# Patient Record
Sex: FEMALE | Race: BLACK OR AFRICAN AMERICAN | URBAN - METROPOLITAN AREA
[De-identification: names, ages, dates, MRNs, and addresses within clinical notes are randomized per-mention and may not be internally consistent; named-entity substitution may affect disease eponyms.]

---

## 2018-03-02 ENCOUNTER — OFFICE VISIT (OUTPATIENT)
Dept: ORTHOPEDIC SURGERY | Age: 38
End: 2018-03-02

## 2018-03-02 VITALS
DIASTOLIC BLOOD PRESSURE: 67 MMHG | BODY MASS INDEX: 20.96 KG/M2 | WEIGHT: 104 LBS | HEIGHT: 59 IN | SYSTOLIC BLOOD PRESSURE: 116 MMHG | HEART RATE: 84 BPM

## 2018-03-02 DIAGNOSIS — G60.0 CMT (CHARCOT-MARIE-TOOTH DISEASE): Primary | ICD-10-CM

## 2018-03-02 DIAGNOSIS — M22.2X1 PATELLOFEMORAL SYNDROME, BILATERAL: ICD-10-CM

## 2018-03-02 DIAGNOSIS — M22.2X2 PATELLOFEMORAL SYNDROME, BILATERAL: ICD-10-CM

## 2018-03-02 RX ORDER — NORETHINDRONE 5 MG/1
TABLET ORAL
Refills: 5 | COMMUNITY
Start: 2018-01-28

## 2018-03-02 RX ORDER — ESTRADIOL 2 MG/1
TABLET ORAL
Refills: 3 | COMMUNITY
Start: 2018-02-21

## 2018-03-02 NOTE — PROGRESS NOTES
Patient: Constance Bailey                MRN: [id number]       SSN: [id number]  YOB: 1980        AGE: 40 y.o. SEX: female  Body mass index is 21.01 kg/(m^2). PCP: Yamilka Whelan MD  03/02/18    Chief Complaint: Bilateral knees \"giving out\"    HISTORY OF PRESENT ILLNESS: Ms. Constance Bailey is a 40-year-old female who presents to the office today with bilateral knee complaints. She states that she feels that both of her knees give out at times. This happens when she is getting up from a chair. It can also happen when she is standing for long periods of time. She denies much in the way of pain. She feels that she has these sensations when her knees give out. Her knees also have some popping as she describes it at times. She denies any trauma or injuries associated with this. She denies any numbness or tingling. 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 These complaints have been going on for several months, if not up to one year at this point. She has not had any physical therapy or injections. Past Medical History:   Diagnosis Date    CMT (Charcot-Claudia-Tooth disease)     Neuropathy        History reviewed. No pertinent family history. Current Outpatient Prescriptions   Medication Sig Dispense Refill    estradiol (ESTRACE) 2 mg tablet TAKE 1 TABLET BY MOUTH ONCE A DAY  3    norethindrone acetate (AYGESTIN) 5 mg tablet TAKE 1 TABLET DAILY ON DAYS 21-28  5       Allergies   Allergen Reactions    Amoxicillin Hives       Past Surgical History:   Procedure Laterality Date    HX ORTHOPAEDIC Bilateral     foot reconstuction due to CMT       Social History     Social History    Marital status: UNKNOWN     Spouse name: N/A    Number of children: N/A    Years of education: N/A     Occupational History    Not on file.      Social History Main Topics    Smoking status: Never Smoker    Smokeless tobacco: Never Used    Alcohol use No    Drug use: Not on file    Sexual activity: Not on file     Other Topics Concern    Not on file     Social History Narrative    No narrative on file       REVIEW OF SYSTEMS:      CON: negative for recent weight loss/gain, fever, or chills  EYE: negative for double or blurry vision  ENT: negative for hoarseness  RS:   negative for cough, URI, SOB  CV:  negative for chest pain, palpitations  GI:    negative for blood in stool, nausea/vomiting  :  negative for blood in urine  MS: As per HPI  Other systems reviewed and noted below. PHYSICAL EXAMINATION:  Visit Vitals    /67    Pulse 84    Ht 4' 11\" (1.499 m)    Wt 104 lb (47.2 kg)    BMI 21.01 kg/m2     Body mass index is 21.01 kg/(m^2). GENERAL: Alert and oriented x3, in no acute distress, well-developed, well-nourished. HEENT: Normocephalic, atraumatic. RESP: Non labored breathing with equal chest rise on inspiration. CV: Well perfused extremities. No cyanosis or clubbing noted. ABDOMEN: Soft, non-tender, non-distended. PHYSICAL EXAMINATION:  Musculoskeletal examination of both knees reveals no obvious deformity. No warmth, erythema, or effusion. Patella tracks slightly laterally bilaterally with flexion and extension. No pain with patellar compression. No instability is appreciated. She has a negative Courts. She has a negative Lachmans. She is neurovascularly intact distally. RADIOGRAPHS:  X-rays, three views of both knees weightbearing, were obtained today in the office. These do not show any fractures, dislocations, or degenerative disc disease. On the sunrise view she has a slight lateral tilt to each of her patellae. ASSESSMENT/PLAN:  Ms. Darnella Meckel is a 70-year-old female with bilateral knee complaints. I think her issues are stemming from her patellofemoral joint. She has slight lateral subluxation and lateral tracking of her patella bilaterally.   I think that her sensation of her knees giving way is a result of her patellofemoral disease. Therefore I would like to get her started on some physical therapy to work on strengthening her quadriceps muscle. I discussed this with her. I will see her back in six weeks to see how she is doing.                 Electronically signed by: Leesa Alvarado MD

## 2018-03-02 NOTE — PATIENT INSTRUCTIONS
Patellofemoral Pain Syndrome: Care Instructions  Your Care Instructions  Patellofemoral pain syndrome is pain in the front of the knee. It is caused by overuse, weak thigh muscles (quadriceps), or a problem with the way the kneecap moves. Extra weight may also cause this syndrome. The patella is the kneecap, and the femur is the thighbone. Some people may have pain in the front of the knee from a condition called chondromalacia. In this problem, the underside of the knee cartilage wears down and frays. Cartilage is a rubbery tissue that cushions joints. In some cases, the kneecap does not move, or track, in a normal way. You may have knee pain when you run, walk down hills or steps, or do another activity. Sitting for a long time also can cause knee pain. Your knee pain may get better with medicines for pain and swelling and exercises to make your quadriceps stronger. Losing weight, if you need to, may also help with pain. Follow-up care is a key part of your treatment and safety. Be sure to make and go to all appointments, and call your doctor if you are having problems. It's also a good idea to know your test results and keep a list of the medicines you take. How can you care for yourself at home? · Ask your doctor if you can take an over-the-counter pain medicine, such as acetaminophen (Tylenol), ibuprofen (Advil, Motrin), or naproxen (Aleve). Be safe with medicines. Read and follow all instructions on the label. · Rest and protect your knee. Take a break from activities that cause pain, such as long periods of sitting or kneeling. · Put ice or a cold pack on your knee for 10 to 20 minutes after activity. Put a thin cloth between the ice and your skin. · If your doctor recommends an elastic bandage, sleeve, or other type of support for your knee, wear it as directed. · If your knee is not swollen, you can put moist heat, a heating pad, or a warm cloth on your knee.  After several days of rest, you can begin gentle exercise of your knee. · Reach and stay at a healthy weight. Being overweight puts stress on your knees. · Wear athletic shoes that offer good support, especially if you run. · Use shoe inserts, or orthotics, if they help reduce your knee pain. Many drugstores and shoe stores sell them. · See a physical therapist to learn more exercises and stretches to make your legs stronger. When should you call for help? Watch closely for changes in your health, and be sure to contact your doctor if:  ? · Your knee pain does not get better or gets worse. Where can you learn more? Go to http://tj-monika.info/. Enter X251 in the search box to learn more about \"Patellofemoral Pain Syndrome: Care Instructions. \"  Current as of: March 21, 2017  Content Version: 11.4  © 9623-6552 Healthwise, Incorporated. Care instructions adapted under license by [employer] (which disclaims liability or warranty for this information). If you have questions about a medical condition or this instruction, always ask your healthcare professional. [address] any warranty or liability for your use of this information.

## 2018-04-13 ENCOUNTER — OFFICE VISIT (OUTPATIENT)
Dept: ORTHOPEDIC SURGERY | Age: 38
End: 2018-04-13

## 2018-04-13 VITALS
DIASTOLIC BLOOD PRESSURE: 60 MMHG | SYSTOLIC BLOOD PRESSURE: 101 MMHG | BODY MASS INDEX: 21.17 KG/M2 | WEIGHT: 105 LBS | HEIGHT: 59 IN | HEART RATE: 79 BPM

## 2018-04-13 DIAGNOSIS — M62.81 MUSCLE WEAKNESS-GENERAL: ICD-10-CM

## 2018-04-13 DIAGNOSIS — M62.81 QUADRICEPS WEAKNESS: Primary | ICD-10-CM

## 2018-04-13 NOTE — MR AVS SNAPSHOT
[facility] 
 
 
 [address] [facility] 
[phone] Patient: Christina Syed MRN: [id number] ROP:1/7/[birth year]05 Visit Information Date & Time Provider Department Dept. Phone Encounter #  
 4/13/2018 10:30 AM Cristel Bernabe, [address] [facility] [place] [phone] [id number] Your Appointments 4/13/2018 10:30 AM  
Follow Up with Cristel Bernabe MD  
[address] [facility], [address] and [facility] [place] ([facility]) Appt Note: vadim knee 6wk fu after physical therapy at  [facility] PT; PT R/S TO THIS NEW DATE; vadim knee 6wk fu after physical therapy at [address] [place] [address]  
[phone]  
  
   
 2012 [address] [place] [address] Upcoming Health Maintenance Date Due DTaP/Tdap/Td series (1 - Tdap) 8/7/2001 PAP AKA CERVICAL CYTOLOGY 8/7/2001 Influenza Age 5 to Adult 8/1/2017 Allergies as of 4/13/2018  Review Complete On: 4/13/2018 By: Cristel Bernabe MD  
  
 Severity Noted Reaction Type Reactions Amoxicillin  03/02/2018    Hives Current Immunizations  Never Reviewed No immunizations on file. Not reviewed this visit You Were Diagnosed With   
  
 Codes Comments Quadriceps weakness    -  Primary ICD-10-CM: M62.81 ICD-9-CM: 728.87 Muscle weakness-general     ICD-10-CM: M62.81 ICD-9-CM: 728.87 Vitals BP Pulse Height(growth percentile) Weight(growth percentile) BMI Smoking Status 101/60 79 4' 11\" (1.499 m) 105 lb (47.6 kg) 21.21 kg/m2 Never Smoker Vitals History BMI and BSA Data Body Mass Index Body Surface Area  
 21.21 kg/m 2 1.41 m 2 Your Updated Medication List  
  
   
This list is accurate as of 4/13/18 10:15 AM.  Always use your most recent med list.  
  
  
  
  
 estradiol 2 mg tablet Commonly known as:  ESTRACE  
TAKE 1 TABLET BY MOUTH ONCE A DAY  
  
 norethindrone acetate 5 mg tablet Commonly known as:  AYGESTIN  
TAKE 1 TABLET DAILY ON DAYS 21-28 We Performed the Following REFERRAL TO PHYSICAL THERAPY [JRJ46 Custom] Comments:  
 Upper and lower extremity muscle strengthening and toning exercises with HEP. 6-8 sessions. Referral Information Referral ID Referred By Referred To  
  
 [id number] Glenny TOWNSEND Not Available Visits Status Start Date End Date 1 New Request 4/13/18 4/13/19 If your referral has a status of pending review or denied, additional information will be sent to support the outcome of this decision. Introducing [facility]! [facility] introduces Polyheal patient portal. Now you can access parts of your medical record, email your doctor's office, and request medication refills online. 1. In your internet browser, go to [email or web address] 2. Click on the First Time User? Click Here link in the Sign In box. You will see the New Member Sign Up page. 3. Enter your Polyheal Access Code exactly as it appears below. You will not need to use this code after youve completed the sign-up process. If you do not sign up before the expiration date, you must request a new code. · Polyheal Access Code: [email or web address] Expires: 5/31/2018  9:55 AM 
 
4. Enter the last four digits of your Social Security Number (xxxx) and Date of Birth (mm/dd/yyyy) as indicated and click Submit. You will be taken to the next sign-up page. 5. Create a [email or web address] ID. This will be your Polyheal login ID and cannot be changed, so think of one that is secure and easy to remember. 6. Create a Polyheal password. You can change your password at any time. 7. Enter your Password Reset Question and Answer. This can be used at a later time if you forget your password. 8. Enter your e-mail address. You will receive e-mail notification when new information is available in [address] 9. Click Sign Up. You can now view and download portions of your medical record. 10. Click the Download Summary menu link to download a portable copy of your medical information. If you have questions, please visit the Frequently Asked Questions section of the Stampt website. Remember, Stampt is NOT to be used for urgent needs. For medical emergencies, dial 911. Now available from your iPhone and Android! Please provide this summary of care documentation to your next provider. Your primary care clinician is listed as Sneha Monday. If you have any questions after today's visit, please call [phone].

## 2018-04-13 NOTE — PROGRESS NOTES
HISTORY OF PRESENT ILLNESS: Leanna Santos is here for followup with reference to her lower extremities. She saw Dr. Colleen Piedra about six weeks ago and is here for followup. At that time, he diagnosed her with some patellofemoral issues and some lower extremity quadriceps weakness. She started physical therapy. She was able to do therapy for a few sessions but then was discontinued because of insurance problems. She did state, however, she continued on somewhat of a home exercise program with resistance bands. She has not made a lot or progress overall. She does not complain of any pain in her knees or upper extremities. Her main complaint is that when she is standing for more than a minute or two or walking, she feels like her legs are giving out of her. She also notices weakness in her upper body. She has a history of Charcot-Claudia-Tooth disease. PHYSICAL EXAMINATION:  Clinical examination today reveals a frail, thin, 43-year-old, -American female in no obvious discomfort. With reference to her knees, she has absolutely no effusion or soft tissue swelling of her knees. She has full range of motion of her knees from full extension and flexion to 135? to 140? Leatha Loose There is no pain with this. There is no crepitus in the patellofemoral area of her knees with flexion/extension. Patellofemoral compression test is negative. Patellar apprehension tests are negative. She has good collateral, as well as cruciate ligament stability of her knees. Courts test is negative. Lachman test is negative bilaterally. Anterior and posterior drawer tests are negative. She has very poor quadriceps tone of both lower extremities. On resisted knee extension, I am able to slightly break her knee extension. She also has clinically very poor muscle tone of her upper body and arms. IMPRESSION:   1. Generalized muscle weakness. 2. Patellofemoral syndrome per Dr. Colleen Piedra.      RECOMMENDATIONS:  She does not have pain at this point, and I think the best option for her is to work on muscle strength in the upper and lower extremities. I told her this is not something that will occur just in six weeks. This is going to be about a two to three-year program, but not necessarily in physical therapy that entire time. She needs to go to about six or eight sessions of therapy and have them do some electrical stimulation to these muscles and wake them up and then allow her to do an aggressive home exercise program with resistance and with some weights. I would check her back in about three to four months and see what her progress is at that point, but I have told her that this will be a long process and a continual process to regain her muscle tone and then maintain muscle tone in her extremities. All of her questions were answered. Vitals:    04/13/18 0957   BP: 101/60   Pulse: 79   Weight: 105 lb (47.6 kg)   Height: 4' 11\" (1.499 m)   PainSc:   0 - No pain   PainLoc: Knee       There is no problem list on file for this patient. There are no active problems to display for this patient. Current Outpatient Prescriptions   Medication Sig Dispense Refill    estradiol (ESTRACE) 2 mg tablet TAKE 1 TABLET BY MOUTH ONCE A DAY  3    norethindrone acetate (AYGESTIN) 5 mg tablet TAKE 1 TABLET DAILY ON DAYS 21-28  5     Allergies   Allergen Reactions    Amoxicillin Hives     Past Medical History:   Diagnosis Date    CMT (Charcot-Claudia-Tooth disease)     Neuropathy      Past Surgical History:   Procedure Laterality Date    HX ORTHOPAEDIC Bilateral     foot reconstuction due to CMT     History reviewed. No pertinent family history.   Social History   Substance Use Topics    Smoking status: Never Smoker    Smokeless tobacco: Never Used    Alcohol use No

## 2018-06-20 ENCOUNTER — OFFICE VISIT (OUTPATIENT)
Dept: ORTHOPEDIC SURGERY | Age: 38
End: 2018-06-20

## 2018-06-20 VITALS
BODY MASS INDEX: 21 KG/M2 | HEIGHT: 59 IN | WEIGHT: 104.2 LBS | OXYGEN SATURATION: 100 % | RESPIRATION RATE: 16 BRPM | DIASTOLIC BLOOD PRESSURE: 67 MMHG | HEART RATE: 69 BPM | SYSTOLIC BLOOD PRESSURE: 103 MMHG

## 2018-06-20 DIAGNOSIS — M62.81 MUSCLE WEAKNESS-GENERAL: ICD-10-CM

## 2018-06-20 DIAGNOSIS — G60.0 CMT (CHARCOT-MARIE-TOOTH DISEASE): Primary | ICD-10-CM

## 2018-06-20 NOTE — PROGRESS NOTES
Chief Complaint   Patient presents with    Knee Pain     bilateral weakness/instability     Pain 0/10

## 2018-06-20 NOTE — PROGRESS NOTES
Patient: Ramón Mccormick                MRN: [id number]       SSN: [id number]  YOB: 1980        AGE: 40 y.o. SEX: female  Body mass index is 21.05 kg/(m^2). PCP: Pamela Luna MD  06/20/18    Chief Complaint: Bilateral leg weakness    History of Present Illness:  Sarah He returns to the office today for lower extremity weakness. She has been working in physical therapy, had a visit with Dr. José Santoyo while I was out who recommended physical therapy with quad stimulation. She said most of the stimulation that they did was on her lower legs near her ankles. She has gained some tone in her quads which has helped with her knee stability. She is not currently in physical therapy and has finished up her course of treatment. She has been doing her home exercises. Past Medical History:   Diagnosis Date    CMT (Charcot-Claudia-Tooth disease)     Neuropathy        History reviewed. No pertinent family history. Current Outpatient Prescriptions   Medication Sig Dispense Refill    estradiol (ESTRACE) 2 mg tablet TAKE 1 TABLET BY MOUTH ONCE A DAY  3    norethindrone acetate (AYGESTIN) 5 mg tablet TAKE 1 TABLET DAILY ON DAYS 21-28  5       Allergies   Allergen Reactions    Amoxicillin Hives       Past Surgical History:   Procedure Laterality Date    HX ORTHOPAEDIC Bilateral     foot reconstuction due to CMT       Social History     Social History    Marital status: SINGLE     Spouse name: N/A    Number of children: N/A    Years of education: N/A     Occupational History    Not on file.      Social History Main Topics    Smoking status: Never Smoker    Smokeless tobacco: Never Used    Alcohol use No    Drug use: Not on file    Sexual activity: Not on file     Other Topics Concern    Not on file     Social History Narrative       REVIEW OF SYSTEMS:      CON: negative for recent weight loss/gain, fever, or chills  EYE: negative for double or blurry vision  ENT: negative for hoarseness  RS:   negative for cough, URI, SOB  CV:  negative for chest pain, palpitations  GI:    negative for blood in stool, nausea/vomiting  :  negative for blood in urine  MS: As per HPI  Other systems reviewed and noted below. PHYSICAL EXAMINATION:  Visit Vitals    /67 (BP 1 Location: Right arm, BP Patient Position: Sitting)    Pulse 69    Resp 16    Ht 4' 11\" (1.499 m)    Wt 104 lb 3.2 oz (47.3 kg)    SpO2 100%    BMI 21.05 kg/m2     Body mass index is 21.05 kg/(m^2). GENERAL: Alert and oriented x3, in no acute distress, well-developed, well-nourished. HEENT: Normocephalic, atraumatic. RESP: Non labored breathing with equal chest rise on inspiration. CV: Well perfused extremities. No cyanosis or clubbing noted. ABDOMEN: Soft, non-tender, non-distended. PHYSICAL EXAMINATION:  Physical examination of both legs reveals some improvement in her quad tone. She still has 5-/5 strength in both quad and hamstrings. Knee extension is easily broken but she is able to extend them and hold it against some resistance. She has thin calves and ankles with 4+/5 strength in the gastrocsoleus and tibialis anterior. She walks with a crossed gait. She is neurovascularly intact distally. ASSESSMENT/PLAN:  Jaquan Delatorre is a 40 y.o. female with Charcot-Claudia-Tooth and lower extremity weakness that is greater distally than proximally which is to be expected. She has made some gains with her quads in terms of tone as well as strength. She is having difficulty with the distal musculature which is not unexpected due to the Charcot-Claudia-Tooth. I encouraged her to continue with home exercises and to work on this. This is a lifelong process and not something that is going to turn around over the course of several months. She can continue to work on these exercises at home.   I also discussed with her the possibility of buying an electrical muscle stimulator over the Internet if this is something she wants to do but I do not see any reason to continue her in physical therapy at this point. All of her questions were answered.   I will see her back as needed,            Electronically signed by: Shaquille Garcia MD

## 2019-05-31 ENCOUNTER — ED HISTORICAL/CONVERTED ENCOUNTER (OUTPATIENT)
Dept: OTHER | Age: 39
End: 2019-05-31

## 2020-05-10 ENCOUNTER — ED HISTORICAL/CONVERTED ENCOUNTER (OUTPATIENT)
Dept: OTHER | Age: 40
End: 2020-05-10

## 2023-05-26 RX ORDER — ESTRADIOL 2 MG/1
1 TABLET ORAL DAILY
COMMUNITY
Start: 2018-02-21

## 2024-05-28 DIAGNOSIS — M25.561 RIGHT KNEE PAIN, UNSPECIFIED CHRONICITY: Primary | ICD-10-CM

## 2024-05-28 DIAGNOSIS — M25.562 LEFT KNEE PAIN, UNSPECIFIED CHRONICITY: ICD-10-CM

## 2024-05-30 ENCOUNTER — HOSPITAL ENCOUNTER (OUTPATIENT)
Age: 44
Discharge: HOME | End: 2024-05-30
Payer: COMMERCIAL

## 2024-05-30 DIAGNOSIS — M25.562 LEFT KNEE PAIN, UNSPECIFIED CHRONICITY: ICD-10-CM

## 2024-05-30 DIAGNOSIS — M25.561 RIGHT KNEE PAIN, UNSPECIFIED CHRONICITY: ICD-10-CM

## 2024-05-30 PROCEDURE — 73560 X-RAY EXAM OF KNEE 1 OR 2: CPT

## 2024-05-30 PROCEDURE — 73562 X-RAY EXAM OF KNEE 3: CPT

## 2024-06-10 SDOH — HEALTH STABILITY: PHYSICAL HEALTH: ON AVERAGE, HOW MANY MINUTES DO YOU ENGAGE IN EXERCISE AT THIS LEVEL?: 30 MIN

## 2024-06-10 SDOH — HEALTH STABILITY: PHYSICAL HEALTH: ON AVERAGE, HOW MANY DAYS PER WEEK DO YOU ENGAGE IN MODERATE TO STRENUOUS EXERCISE (LIKE A BRISK WALK)?: 7 DAYS

## 2024-06-13 ENCOUNTER — OFFICE VISIT (OUTPATIENT)
Age: 44
End: 2024-06-13
Payer: COMMERCIAL

## 2024-06-13 VITALS — BODY MASS INDEX: 22.78 KG/M2 | HEIGHT: 59 IN | WEIGHT: 113 LBS

## 2024-06-13 DIAGNOSIS — M17.11 OSTEOARTHRITIS OF RIGHT KNEE, UNSPECIFIED OSTEOARTHRITIS TYPE: ICD-10-CM

## 2024-06-13 DIAGNOSIS — M17.12 OSTEOARTHRITIS OF LEFT KNEE, UNSPECIFIED OSTEOARTHRITIS TYPE: Primary | ICD-10-CM

## 2024-06-13 PROCEDURE — 99203 OFFICE O/P NEW LOW 30 MIN: CPT | Performed by: ORTHOPAEDIC SURGERY

## 2024-06-13 RX ORDER — GABAPENTIN 400 MG/1
CAPSULE ORAL
COMMUNITY

## 2024-06-13 RX ORDER — AMITRIPTYLINE HYDROCHLORIDE 25 MG/1
TABLET, FILM COATED ORAL
COMMUNITY

## 2024-06-13 RX ORDER — LEVONORGESTREL AND ETHINYL ESTRADIOL 0.15-0.03
1 KIT ORAL DAILY
COMMUNITY
Start: 2024-03-27

## 2024-06-13 NOTE — PROGRESS NOTES
Name: Oscar De La Paz    : 1980     [facility]  [address], [address]  [facility] [address]  Dept: [phone]  Dept Fax: [phone]     Chief Complaint   Patient presents with    Knee Pain     Bilateral        Ht 1.499 m (4' 11\")   Wt 51.3 kg (113 lb)   LMP 2024   BMI 22.82 kg/m²      Allergies   Allergen Reactions    Amoxicillin Hives        Current Outpatient Medications   Medication Sig Dispense Refill    SETLAKIN 0.15-0.03 MG per tablet Take 1 tablet by mouth daily      amitriptyline (ELAVIL) 25 MG tablet 1-2 TABLET BY MOUTH DAILY AT NIGHT      gabapentin (NEURONTIN) 400 MG capsule take 1 capsule by mouth every morning and 2 capsule by mouth at bedtime      estradiol (ESTRACE) 2 MG tablet Take 1 tablet by mouth daily      norethindrone (AYGESTIN) 5 MG tablet TAKE 1 TABLET DAILY ON DAYS 21-28       No current facility-administered medications for this visit.      There is no problem list on file for this patient.     Family History   Problem Relation Age of Onset    Diabetes Mother     Cancer Maternal Grandfather     Cancer Maternal Grandmother     Diabetes Maternal Grandmother     Cancer Paternal Grandfather     Cancer Maternal Aunt     Cancer Paternal Aunt     Cancer Paternal Uncle     Diabetes Maternal Aunt     Osteoporosis Maternal Uncle     Rheumatologic Disease Maternal Uncle     Rheumatologic Disease Maternal Aunt        Past Surgical History:   Procedure Laterality Date    FOOT SURGERY   and 2017    ORTHOPEDIC SURGERY Bilateral     foot reconstuction due to CMT      Past Medical History:   Diagnosis Date    CMT (Charcot-Jodee-Tooth disease)     Neuropathy     Osteoarthritis         I have reviewed and agree with Counts include [facility] beds at the [facility] and intake form in chart and the record furthermore I have reviewed prior medical record(s) regarding this patients care during this appointment.     Review of Systems:

## 2024-06-18 ENCOUNTER — HOSPITAL ENCOUNTER (OUTPATIENT)
Age: 44
Setting detail: RECURRING SERIES
Discharge: HOME | End: 2024-06-21
Payer: COMMERCIAL

## 2024-06-18 PROCEDURE — 97162 PT EVAL MOD COMPLEX 30 MIN: CPT

## 2024-06-18 NOTE — THERAPY EVALUATION
KNEE EVAL/ PT DAILY TREATMENT NOTE 10-18    Patient Name: Oscar De La Paz  Date:2024  : 1980  [x]  Patient  Verified  Payor: GEORGE / Plan: [employer] / Product Type: *No Product type* /    In time:1050  Out time:1120  Total Treatment Time (min): 30  Visit #: 1    Medicare/BCBS Only   Total Timed Codes (min):  0 1:1 Treatment Time:  30     Treatment Area: Unilateral primary osteoarthritis, left knee [M17.12]  Unilateral primary osteoarthritis, right knee [M17.11]    SUBJECTIVE  Pt reports history of knee weakness and instability, left  > right, related to CMT. Pt was diagnosed 9 years ago and reports loss of muscle over the years, most noted in hands and lower legs. Pt reports multiple falls, last one occurring a month ago due to legs giving out. Pt reports legs give way when having to stand still longest than 2 minutes without UE support. Pt independent ambulator today, denies use of AD. Pt has AFOs for B feet that she wears twice/weekly due to discomfort. Pt lives alone in 1 story home with 4 steps to enter with railings present. Pt is independent with ADLs with grab bars, drives self. Bending and lifting are difficult. Pt reports exercising daily on bike and with series of full body exercises to maintain independence. Pt follows up with PCP and neurology regularly.       Pt. Goals: \"hoping to get more strength\"    Pain Level (0-10 scale): Current: 0/10    Worst: 5/10 sharp, throbbing pain left knee  []constant [x]intermittent []improving []worsening []no change since onset    Past Medical History:   Diagnosis Date    CMT (Charcot-Jodee-Tooth disease)     Neuropathy     Osteoarthritis      Past Surgical History:   Procedure Laterality Date    FOOT SURGERY  2016 and 2017    ORTHOPEDIC SURGERY Bilateral     foot reconstuction due to CMT       Any medication changes, allergies to medications, adverse drug reactions, diagnosis change, or new procedure performed?: [x] No    [] Yes (see

## 2024-06-18 NOTE — THERAPY EVALUATION
[facility] REHABILITATION  PHYSICAL THERAPY  [facility], [address] [place]  [address]  Phone: [phone]    Fax: [phone]     PLAN OF CARE / STATEMENT OF MEDICAL NECESSITY FOR PHYSICAL THERAPY SERVICES  Patient Name: Oscar De La Paz : 1980   Medical   Diagnosis: Unilateral primary osteoarthritis, left knee [M17.12]  Unilateral primary osteoarthritis, right knee [M17.11] Treatment Diagnosis: Left knee pain, Difficult Gait   Onset Date:      Referral Source: Ned Hernandez MD Start of Care (SOC): 2024   Prior Hospitalization: See medical history Provider #: [id number]   Prior Level of Function: Independent   Comorbidities: CMT, Neuropathy, OA, Foot reconstruction (, )   Medications: Verified on Patient Summary List   The Plan of Care and following information is based on the information from the initial evaluation.   ==========================================================================================  Assessment / Functional Analysis:    Pt is a 43 y.o. year old  female who presents to outpatient clinic today with referral for B knee pain. 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 Pt presents today ambulating independently with impairments that include decreased knee flexion AROM, B LE weakness, decreased endurance, decreased standing balance and intermittent pain limiting function. Pt will benefit from skilled PT intervention to target deficits in effort to maximize pain-free daily activity and improve functional mobility endurance, safety and fall prevention in home and community.   ==========================================================================================  Eval Complexity: History: MEDIUM  Complexity : 1-2 comorbidities / personal factors will impact the outcome/ POC Exam:MEDIUM Complexity : 3 Standardized tests and measures addressin body structure, function,

## 2024-06-20 ENCOUNTER — HOSPITAL ENCOUNTER (OUTPATIENT)
Age: 44
Setting detail: RECURRING SERIES
Discharge: HOME | End: 2024-06-23
Payer: COMMERCIAL

## 2024-06-20 PROCEDURE — 97110 THERAPEUTIC EXERCISES: CPT

## 2024-06-20 PROCEDURE — 97112 NEUROMUSCULAR REEDUCATION: CPT

## 2024-06-20 NOTE — PROGRESS NOTES
PT DAILY TREATMENT NOTE     Patient Name: Oscar De La Paz  Date:2024  : 1980  [x]  Patient  Verified  Payor: GEORGE / Plan: [employer] [facility] / Product Type: *No Product type* /    In time:1100  Out time:1147  Total Treatment Time (min): 47  Total Timed Codes (min): 47  1:1 Treatment Time (min): 47   Visit #: 2 of 7    Treatment Area: Unilateral primary osteoarthritis, left knee [M17.12]  Unilateral primary osteoarthritis, right knee [M17.11]    SUBJECTIVE  Pt reported states that she feels ok today.    Pain Level (0-10 scale): 0    Any medication changes, allergies to medications, adverse drug reactions, diagnosis change, or new procedure performed?: [x] No    [] Yes (see summary sheet for update)        OBJECTIVE    30 min Therapeutic Exercise:  [x] See flow sheet :   Rationale: increase ROM, increase strength, improve coordination, and improve balance to improve the patient’s ability to return to prior level of function before injury/illness with reduced pain, achieving optimal strength and function to perform household tasks, daily activities, and return to community events, and improve safety with movements.     17 min Neuromuscular Re-education:  [x]  See flow sheet :   Rationale: increase ROM, increase strength, improve coordination, and improve balance  to improve the patient’s ability to improve balance and stability, attempting to gain control of movement and strength.       min Gait Training:  ___ feet with ___ device on level surfaces with ___ level of assist   Rationale:           With IFEOMA [place]ORRE   NR  GT   [facility] Patient Education: [x] Review HEP    [x] Progressed/Changed HEP based on:   [] positioning   [] body mechanics   [] transfers   [] heat/ice application          Pain Level (0-10 scale) post treatment: 0    ASSESSMENT/Changes in Function: Began session today with warm up on stepper, then proceeded to sit to stands w CGA, then to supine to perform balance challenges in // bars: 
2

## 2024-06-26 ENCOUNTER — HOSPITAL ENCOUNTER (OUTPATIENT)
Age: 44
Setting detail: RECURRING SERIES
Discharge: HOME | End: 2024-06-29
Payer: COMMERCIAL

## 2024-06-26 PROCEDURE — 97112 NEUROMUSCULAR REEDUCATION: CPT

## 2024-06-26 PROCEDURE — 97150 GROUP THERAPEUTIC PROCEDURES: CPT

## 2024-06-26 PROCEDURE — 97110 THERAPEUTIC EXERCISES: CPT

## 2024-06-26 NOTE — PROGRESS NOTES
PT DAILY TREATMENT NOTE     Patient Name: Oscar De La Paz  Date:2024  : 1980  [x]  Patient  Verified  Payor: GEORGE / Plan: [employer] [facility] / Product Type: *No Product type* /    In time:10:54  Out time:1146  Total Treatment Time (min): 52  Total Timed Codes (min): 52  1:1 Treatment Time (min): 52   Visit #: 3 of 7    Treatment Area: Unilateral primary osteoarthritis, left knee [M17.12]  Unilateral primary osteoarthritis, right knee [M17.11]    SUBJECTIVE  Pt reported states that she is doing ok today. Denies pain, reports no falls.    Pain Level (0-10 scale): 0    Any medication changes, allergies to medications, adverse drug reactions, diagnosis change, or new procedure performed?: [x] No    [] Yes (see summary sheet for update)    OBJECTIVE    15 min Therapeutic Exercise:  [x] See flow sheet :   Rationale: increase ROM, increase strength, improve coordination, and improve balance to improve the patient’s ability to return to prior level of function before injury/illness with reduced pain, achieving optimal strength and function to perform household tasks, daily activities, and return to community events, and improve safety with movements.     22 min Neuromuscular Re-education:  [x]  See flow sheet :   Rationale: increase ROM, increase strength, improve coordination, and improve balance  to improve the patient’s ability to improve balance and stability, attempting to gain control of movement and strength.    15 min Group:  Overlapped with another patient.   Rationale: Overlap with other patient.     min Gait Training:  ___ feet with ___ device on level surfaces with ___ level of assist   Rationale:           With IFEOMA  TA   NR  GT   Misc Patient Education: [x] Review HEP    [x] Progressed/Changed HEP based on:   [] positioning   [] body mechanics   [] transfers   [] heat/ice application          Pain Level (0-10 scale) post treatment: 0    ASSESSMENT/Changes in Function: Began session today

## 2024-06-28 ENCOUNTER — HOSPITAL ENCOUNTER (OUTPATIENT)
Age: 44
Setting detail: RECURRING SERIES
Discharge: HOME | End: 2024-07-01
Payer: COMMERCIAL

## 2024-06-28 PROCEDURE — 97110 THERAPEUTIC EXERCISES: CPT

## 2024-06-28 PROCEDURE — 97112 NEUROMUSCULAR REEDUCATION: CPT

## 2024-06-28 NOTE — PROGRESS NOTES
PT DAILY TREATMENT NOTE     Patient Name: Oscar De La Paz  Date:2024  : 1980  [x]  Patient  Verified  Payor: GEORGE / Plan: [employer] [facility] / Product Type: *No Product type* /    In time:1048  Out time:1140  Total Treatment Time (min): 52  Total Timed Codes (min): 52  1:1 Treatment Time (min): 52   Visit #: 4 of 7    Treatment Area: Unilateral primary osteoarthritis, left knee [M17.12]  Unilateral primary osteoarthritis, right knee [M17.11]    SUBJECTIVE  Pt reported states that she is doing ok today.     Pain Level (0-10 scale): 0    Any medication changes, allergies to medications, adverse drug reactions, diagnosis change, or new procedure performed?: [x] No    [] Yes (see summary sheet for update)    OBJECTIVE    31 min Therapeutic Exercise:  [x] See flow sheet :   Rationale: increase ROM, increase strength, improve coordination, and improve balance to improve the patient’s ability to return to prior level of function before injury/illness with reduced pain, achieving optimal strength and function to perform household tasks, daily activities, and return to community events, and improve safety with movements.     21 min Neuromuscular Re-education:  [x]  See flow sheet :   Rationale: increase ROM, increase strength, improve coordination, and improve balance  to improve the patient’s ability to improve balance and stability, attempting to gain control of movement and strength.       min Gait Training:  ___ feet with ___ device on level surfaces with ___ level of assist   Rationale:           With IFEOMA  TA   NR  GT   [facility] Patient Education: [x] Review HEP    [x] Progressed/Changed HEP based on:   [] positioning   [] body mechanics   [] transfers   [] heat/ice application          Pain Level (0-10 scale) post treatment: 0    ASSESSMENT/Changes in Function: Began session today with warm up on stepper, then proceeded to sit to stands with SBA. Pt has LOB but self corrects.  Supine exercise heel

## 2024-07-02 ENCOUNTER — TELEPHONE (OUTPATIENT)
Age: 44
End: 2024-07-02

## 2024-07-02 ENCOUNTER — HOSPITAL ENCOUNTER (OUTPATIENT)
Age: 44
Setting detail: RECURRING SERIES
Discharge: HOME | End: 2024-07-05
Payer: COMMERCIAL

## 2024-07-02 PROCEDURE — 97112 NEUROMUSCULAR REEDUCATION: CPT

## 2024-07-02 PROCEDURE — 97110 THERAPEUTIC EXERCISES: CPT

## 2024-07-02 NOTE — PROGRESS NOTES
PT DAILY TREATMENT NOTE     Patient Name: Oscar De La Paz  Date:2024  : 1980  [x]  Patient  Verified  Payor: GEORGE / Plan: [employer] [facility] / Product Type: *No Product type* /    In time:1100  Out time:1151  Total Treatment Time (min): 51  Total Timed Codes (min): 51  1:1 Treatment Time (min): 51  Visit #: 5 of 7    Treatment Area: Unilateral primary osteoarthritis, left knee [M17.12]  Unilateral primary osteoarthritis, right knee [M17.11]    SUBJECTIVE  Pt reported states that she is doing ok today.     Pain Level (0-10 scale): 0    Any medication changes, allergies to medications, adverse drug reactions, diagnosis change, or new procedure performed?: [x] No    [] Yes (see summary sheet for update)    OBJECTIVE    30 min Therapeutic Exercise:  [x] See flow sheet :   Rationale: increase ROM, increase strength, improve coordination, and improve balance to improve the patient’s ability to return to prior level of function before injury/illness with reduced pain, achieving optimal strength and function to perform household tasks, daily activities, and return to community events, and improve safety with movements.     21 min Neuromuscular Re-education:  [x]  See flow sheet :   Rationale: increase ROM, increase strength, improve coordination, and improve balance  to improve the patient’s ability to improve balance and stability, attempting to gain control of movement and strength.       min Gait Training:  ___ feet with ___ device on level surfaces with ___ level of assist   Rationale:           With TE  TA   NR  GT   [facility] Patient Education: [x] Review HEP    [x] Progressed/Changed HEP based on:   [] positioning   [] body mechanics   [] transfers   [] heat/ice application          Pain Level (0-10 scale) post treatment: 0    ASSESSMENT/Changes in Function: Began session today with warm up on stepper, then proceeded to sit to stands with airex pad under feet, SBA. Pt has LOB but self corrects.

## 2024-07-02 NOTE — TELEPHONE ENCOUNTER
Patient was scheduled in error back in May for knee instability with an elbow/shoulder specialist.  She was contacted today to reschedule that appointment and indicated she needed the [facility] office for appointments.  Patient was offered 2:40 on 7/11 as this was the closest to the original appointment.  She was seen by MADIHA and says she has not been pleased with their treatment plan and that therapy is failing so this would be a second opinion.  Xrays are in chart from May.  Please advise if this appointment is acceptable as scheduled or if it needs to be changed.

## 2024-07-09 ENCOUNTER — APPOINTMENT (OUTPATIENT)
Age: 44
End: 2024-07-09
Payer: COMMERCIAL

## 2024-07-11 ENCOUNTER — HOSPITAL ENCOUNTER (OUTPATIENT)
Age: 44
Setting detail: RECURRING SERIES
Discharge: HOME | End: 2024-07-14
Payer: COMMERCIAL

## 2024-07-11 PROCEDURE — 97112 NEUROMUSCULAR REEDUCATION: CPT

## 2024-07-11 PROCEDURE — 97110 THERAPEUTIC EXERCISES: CPT

## 2024-07-11 NOTE — PROGRESS NOTES
Patient: Oscar De La Paz                MRN: [id number]       SSN: [id number]  YOB: 1980        AGE: 43 y.o.        SEX: female  BMI: Body mass index is 22.82 kg/m².    PCP: Mark Mixon III, MD  07/12/24    Chief Complaint: Knee Pain (bilat)      1. Bilateral congenital genu valgum  -     [05961] Knee 4V  -     [40069] Knee 4V  -     DME Order for (Specify) as OP  2. Sensation of knee instability  -     [00327] Knee 4V  -     [06819] Knee 4V  3. Primary osteoarthritis of right knee  4. Primary osteoarthritis of left knee        HPI:  Oscar De La Paz is a 43 y.o. female with chief complaint of   Chief Complaint   Patient presents with    Knee Pain     bilat     Patient reports today for bilateral knee instability.  She was previously seen by Dr. Hernandez on June 13, 2024.  She was diagnosed with bilateral knee chondromalacia and referred to physical therapy.  She states that physical therapy did help but she continues to have a lot of instability. She comes in today for a second opinion and is wondering if custom knee braces would help her as she is not in constant pain and has no clicking/ locking of her knees. She denies any hip pain or radicular symptoms.     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 No evidence of acute fractures or dislocations.       PHYSICAL EXAMINATION:  Ht 1.499 m (4' 11\")   LMP 04/06/2024   BMI 22.82 kg/m²   Body mass index is 22.82 kg/m².  Wt Readings from Last 3 Encounters:   06/13/24 51.3 kg (113 lb)       GENERAL: Alert and oriented x3, in no acute distress.  HEENT: Normocephalic, atraumatic.    MSK: Right Knee Exam     Tenderness   The

## 2024-07-11 NOTE — PROGRESS NOTES
PT DAILY TREATMENT NOTE     Patient Name: Oscar De La Paz  Date:2024  : 1980  [x]  Patient  Verified  Payor: GEORGE / Plan: [employer] / Product Type: *No Product type* /    In time:10:20  Out time:11:15  Total Treatment Time (min): 55  Total Timed Codes (min): 55  1:1 Treatment Time (min): 55   Visit # 6 of 7      Treatment Area: Unilateral primary osteoarthritis, left knee [M17.12]  Unilateral primary osteoarthritis, right knee [M17.11]    SUBJECTIVE  Pt reports to therapy with no new complaints  Pain Level (0-10 scale): 0/10  Any medication changes, allergies to medications, adverse drug reactions, diagnosis change, or new procedure performed?: [x] No    [] Yes (see summary sheet for update)        OBJECTIVE    30 min Therapeutic Exercise:  [x] See flow sheet :   Rationale: increase ROM and increase strength to improve the patient’s ability to perform ADLs with greater ease.    25 min Neuromuscular Re-education:  [x]  See flow sheet :   Rationale: improve coordination and improve balance  to improve the patient’s ability to ambulate safely and improve functional mobility.            With TE  TA   NR  GT   Misc Patient Education: [x] Review HEP    [] Progressed/Changed HEP based on:   [] positioning   [] body mechanics   [] transfers   [] heat/ice application          Pain Level (0-10 scale) post treatment: 0/10    ASSESSMENT/Changes in Function: see progress note      Patient will continue to benefit from skilled PT services to modify and progress therapeutic interventions, analyze and address functional mobility deficits, analyze and address ROM deficits, analyze and address strength deficits, and analyze and address soft tissue restrictions to attain remaining goals.     []  See Plan of Care  [x]  See progress note/recertification  []  See Discharge Summary             PLAN  [x]  Upgrade activities as tolerated     [x]  Continue plan of care  [x]  Update interventions per flow

## 2024-07-11 NOTE — THERAPY RECERTIFICATION
[facility] REHABILITATION  PHYSICAL THERAPY  [facility], [address] [place]  [address]  Phone: [phone]    Fax: [phone]   Progress Note/CONTINUED PLAN OF CARE for PHYSICAL THERAPY          Patient Name: Oscar De La Paz : 1980   Treatment/Medical Diagnosis: Unilateral primary osteoarthritis, left knee [M17.12]  Unilateral primary osteoarthritis, right knee [M17.11]   Onset Date:     Referral Source: Ned Hernandez MD Start of Care (SOC): 24   Prior Hospitalization: See Medical History Provider #: [id number]   Prior Level of Function: Independent   Comorbidities: Past Medical History:   Diagnosis Date    CMT (Charcot-Jodee-Tooth disease)     Neuropathy     Osteoarthritis    Foot reconstruction in , )     Medications: Verified on Patient Summary List   Visits from SOC: 6 Missed Visits: 1       Objective:  Palpation: non-tender, decreased to light touch distal LEs        ROM / Strength  [] Unable to assess                 AROM     PROM          Strength       Left Right Left Right Left Right   Hip Flexion         3/5 3/5     Extension         3+/5  (3/5) 3+/5     Abduction         3/5  (2+/5) 3/5     Adduction               Knee Flexion 130  (124) 130  (120) 138  (134) 135  (132) 3+/5  (3/5) 3+/5  (3/5)     Extension WFL WFL     3+/5 3+/5   Ankle Plantarflexion         3+/5  (3/5) 3+/5  (3/5)     Dorsiflexion  Inversion  Eversion         3/5  (<3/5) 3/5  (<3/5)   -limited with ankle AROM bilaterally secondary to CMT and surgical history      Patellar Mobility:     Hypermobile:       [] Left   [] Right         Hypomobile:   [] Left   [] Right                   .     Gait:                [] Normal     [x] Antalgic    [] NWB    Device: none                          Distance: 250 feet  Comments: discontinuous pattern, decreased corwin, asymmetrical stance times, decreased knee flexion, hyperextension in stance        Balance: Static stance: Good, Dynamic

## 2024-07-12 ENCOUNTER — OFFICE VISIT (OUTPATIENT)
Age: 44
End: 2024-07-12
Payer: COMMERCIAL

## 2024-07-12 ENCOUNTER — APPOINTMENT (OUTPATIENT)
Age: 44
End: 2024-07-12
Payer: COMMERCIAL

## 2024-07-12 VITALS — BODY MASS INDEX: 22.82 KG/M2 | HEIGHT: 59 IN

## 2024-07-12 DIAGNOSIS — Q74.1 BILATERAL CONGENITAL GENU VALGUM: Primary | ICD-10-CM

## 2024-07-12 DIAGNOSIS — R29.898 SENSATION OF KNEE INSTABILITY: ICD-10-CM

## 2024-07-12 DIAGNOSIS — M17.11 PRIMARY OSTEOARTHRITIS OF RIGHT KNEE: ICD-10-CM

## 2024-07-12 DIAGNOSIS — M17.12 PRIMARY OSTEOARTHRITIS OF LEFT KNEE: ICD-10-CM

## 2024-07-12 PROCEDURE — 99204 OFFICE O/P NEW MOD 45 MIN: CPT

## 2024-07-12 PROCEDURE — 73564 X-RAY EXAM KNEE 4 OR MORE: CPT

## 2024-07-16 ENCOUNTER — HOSPITAL ENCOUNTER (OUTPATIENT)
Age: 44
Setting detail: RECURRING SERIES
Discharge: HOME | End: 2024-07-19
Payer: COMMERCIAL

## 2024-07-16 ENCOUNTER — APPOINTMENT (OUTPATIENT)
Age: 44
End: 2024-07-16
Payer: COMMERCIAL

## 2024-07-16 PROCEDURE — 97110 THERAPEUTIC EXERCISES: CPT

## 2024-07-16 PROCEDURE — 97112 NEUROMUSCULAR REEDUCATION: CPT

## 2024-07-16 NOTE — PROGRESS NOTES
PT DAILY TREATMENT NOTE     Patient Name: Oscar De La Paz  Date:2024  : 1980  [x]  Patient  Verified  Payor: GEORGE / Plan: [employer] / Product Type: *No Product type* /    In time:1024  Out time:1108  Total Treatment Time (min): 44  Total Timed Codes (min): 44  1:1 Treatment Time (min): 44  Visit #: 7 of 7    Treatment Area: Unilateral primary osteoarthritis, left knee [M17.12]  Unilateral primary osteoarthritis, right knee [M17.11]    SUBJECTIVE  Pt reported states that she is doing ok today.     Pain Level (0-10 scale): 0    Any medication changes, allergies to medications, adverse drug reactions, diagnosis change, or new procedure performed?: [x] No    [] Yes (see summary sheet for update)    OBJECTIVE    16 min Therapeutic Exercise:  [x] See flow sheet :   Rationale: increase ROM, increase strength, improve coordination, and improve balance to improve the patient’s ability to return to prior level of function before injury/illness with reduced pain, achieving optimal strength and function to perform household tasks, daily activities, and return to community events, and improve safety with movements.     28 min Neuromuscular Re-education:  [x]  See flow sheet :   Rationale: increase ROM, increase strength, improve coordination, and improve balance  to improve the patient’s ability to improve balance and stability, attempting to gain control of movement and strength.       min Gait Training:  ___ feet with ___ device on level surfaces with ___ level of assist   Rationale:           With TE  TA   NR  GT   [facility] Patient Education: [x] Review HEP    [x] Progressed/Changed HEP based on:   [] positioning   [] body mechanics   [] transfers   [] heat/ice application          Pain Level (0-10 scale) post treatment: 0    ASSESSMENT/Changes in Function: Began session today with warm up on stepper, then proceeded to sit to stands with airex pad under feet, SBA. Pt has LOB but self corrects

## 2024-07-18 ENCOUNTER — HOSPITAL ENCOUNTER (OUTPATIENT)
Age: 44
Setting detail: RECURRING SERIES
Discharge: HOME | End: 2024-07-21
Payer: COMMERCIAL

## 2024-07-18 PROCEDURE — 97110 THERAPEUTIC EXERCISES: CPT

## 2024-07-18 PROCEDURE — 97112 NEUROMUSCULAR REEDUCATION: CPT

## 2024-07-18 NOTE — PROGRESS NOTES
PT DAILY TREATMENT NOTE     Patient Name: Oscar De La Paz  Date:2024  : 1980  [x]  Patient  Verified  Payor: GEORGE / Plan: [employer] / Product Type: *No Product type* /    In time:1006  Out time:1050  Total Treatment Time (min): 44  Total Timed Codes (min): 44  1:1 Treatment Time (min): 44  Visit #: 1 of 5 (new auth)    Treatment Area: Unilateral primary osteoarthritis, left knee [M17.12]  Unilateral primary osteoarthritis, right knee [M17.11]    SUBJECTIVE  Pt reported states that she is doing ok today.     Pain Level (0-10 scale): 0    Any medication changes, allergies to medications, adverse drug reactions, diagnosis change, or new procedure performed?: [x] No    [] Yes (see summary sheet for update)    OBJECTIVE    18 min Therapeutic Exercise:  [x] See flow sheet :   Rationale: increase ROM, increase strength, improve coordination, and improve balance to improve the patient’s ability to return to prior level of function before injury/illness with reduced pain, achieving optimal strength and function to perform household tasks, daily activities, and return to community events, and improve safety with movements.     26 min Neuromuscular Re-education:  [x]  See flow sheet :   Rationale: increase ROM, increase strength, improve coordination, and improve balance  to improve the patient’s ability to improve balance and stability, attempting to gain control of movement and strength.       min Gait Training:  ___ feet with ___ device on level surfaces with ___ level of assist   Rationale:           With IFEOMA  TA   NR  GT   [facility] Patient Education: [x] Review HEP    [x] Progressed/Changed HEP based on:   [] positioning   [] body mechanics   [] transfers   [] heat/ice application          Pain Level (0-10 scale) post treatment: 0    ASSESSMENT/Changes in Function: Began session today with warm up on stepper, then proceeded to sit to stands with airex pad under feet, SBA. Pt has LOB but self

## 2024-07-19 ENCOUNTER — APPOINTMENT (OUTPATIENT)
Age: 44
End: 2024-07-19
Payer: COMMERCIAL

## 2024-07-23 ENCOUNTER — HOSPITAL ENCOUNTER (OUTPATIENT)
Age: 44
Setting detail: RECURRING SERIES
Discharge: HOME | End: 2024-07-26
Payer: COMMERCIAL

## 2024-07-23 PROCEDURE — 97110 THERAPEUTIC EXERCISES: CPT

## 2024-07-23 PROCEDURE — 97112 NEUROMUSCULAR REEDUCATION: CPT

## 2024-07-23 NOTE — PROGRESS NOTES
PT DAILY TREATMENT NOTE     Patient Name: Oscar De La Paz  Date:2024  : 1980  [x]  Patient  Verified  Payor: GEORGE / Plan: [employer] / Product Type: *No Product type* /    In time:1032  Out time:1116  Total Treatment Time (min): 44  Total Timed Codes (min): 44  1:1 Treatment Time (min): 44  Visit #: 2 of 5 (new auth)    Treatment Area: Unilateral primary osteoarthritis, left knee [M17.12]  Unilateral primary osteoarthritis, right knee [M17.11]    SUBJECTIVE  Pt reported states that she is doing ok today.     Pain Level (0-10 scale): 0    Any medication changes, allergies to medications, adverse drug reactions, diagnosis change, or new procedure performed?: [x] No    [] Yes (see summary sheet for update)    OBJECTIVE    14 min Therapeutic Exercise:  [x] See flow sheet :   Rationale: increase ROM, increase strength, improve coordination, and improve balance to improve the patient’s ability to return to prior level of function before injury/illness with reduced pain, achieving optimal strength and function to perform household tasks, daily activities, and return to community events, and improve safety with movements.     30 min Neuromuscular Re-education:  [x]  See flow sheet :   Rationale: increase ROM, increase strength, improve coordination, and improve balance  to improve the patient’s ability to improve balance and stability, attempting to gain control of movement and strength.       min Gait Training:  ___ feet with ___ device on level surfaces with ___ level of assist   Rationale:           With IFEOMA  TA   NR  GT   [facility] Patient Education: [x] Review HEP    [x] Progressed/Changed HEP based on:   [] positioning   [] body mechanics   [] transfers   [] heat/ice application          Pain Level (0-10 scale) post treatment: 0    ASSESSMENT/Changes in Function: Began session today with warm up on stepper, then proceeded to sit to stands with airex pad under feet, SBA. Pt has LOB but self

## 2024-07-25 ENCOUNTER — HOSPITAL ENCOUNTER (OUTPATIENT)
Age: 44
Setting detail: RECURRING SERIES
Discharge: HOME | End: 2024-07-28
Payer: COMMERCIAL

## 2024-07-25 PROCEDURE — 97110 THERAPEUTIC EXERCISES: CPT

## 2024-07-25 PROCEDURE — 97112 NEUROMUSCULAR REEDUCATION: CPT

## 2024-07-25 NOTE — PROGRESS NOTES
PT DAILY TREATMENT NOTE     Patient Name: Oscar De La Paz  Date:2024  : 1980  [x]  Patient  Verified  Payor: GEORGE / Plan: [employer] / Product Type: *No Product type* /    In time:1024  Out time:1108  Total Treatment Time (min): 44  Total Timed Codes (min): 44  1:1 Treatment Time (min): 44  Visit #: 3 of 5 (new auth)    Treatment Area: Unilateral primary osteoarthritis, left knee [M17.12]  Unilateral primary osteoarthritis, right knee [M17.11]    SUBJECTIVE  Pt reported states that she is doing ok today. No complaints.     Pain Level (0-10 scale): 0    Any medication changes, allergies to medications, adverse drug reactions, diagnosis change, or new procedure performed?: [x] No    [] Yes (see summary sheet for update)    OBJECTIVE    14 min Therapeutic Exercise:  [x] See flow sheet :   Rationale: increase ROM, increase strength, improve coordination, and improve balance to improve the patient’s ability to return to prior level of function before injury/illness with reduced pain, achieving optimal strength and function to perform household tasks, daily activities, and return to community events, and improve safety with movements.     30 min Neuromuscular Re-education:  [x]  See flow sheet :   Rationale: increase ROM, increase strength, improve coordination, and improve balance  to improve the patient’s ability to improve balance and stability, attempting to gain control of movement and strength.       min Gait Training:  ___ feet with ___ device on level surfaces with ___ level of assist   Rationale:           With IFEOMA  TA   NR  GT   [facility] [place]: [x] Review HEP    [x] Progressed/Changed HEP based on:   [] positioning   [] body mechanics   [] transfers   [] heat/ice application          Pain Level (0-10 scale) post treatment: 0    ASSESSMENT/Changes in Function: Began session today with warm up on stepper, then proceeded to sit to stands with airex pad under feet, SBA. Pt has

## 2024-07-26 SDOH — HEALTH STABILITY: PHYSICAL HEALTH: ON AVERAGE, HOW MANY MINUTES DO YOU ENGAGE IN EXERCISE AT THIS LEVEL?: 30 MIN

## 2024-07-26 SDOH — HEALTH STABILITY: PHYSICAL HEALTH: ON AVERAGE, HOW MANY DAYS PER WEEK DO YOU ENGAGE IN MODERATE TO STRENUOUS EXERCISE (LIKE A BRISK WALK)?: 7 DAYS

## 2024-07-29 ENCOUNTER — OFFICE VISIT (OUTPATIENT)
Age: 44
End: 2024-07-29
Payer: COMMERCIAL

## 2024-07-29 VITALS — BODY MASS INDEX: 23.18 KG/M2 | WEIGHT: 115 LBS | HEIGHT: 59 IN

## 2024-07-29 DIAGNOSIS — R26.81 UNSTEADY GAIT WHEN WALKING: Primary | ICD-10-CM

## 2024-07-29 DIAGNOSIS — M54.41 CHRONIC BILATERAL LOW BACK PAIN WITH BILATERAL SCIATICA: ICD-10-CM

## 2024-07-29 DIAGNOSIS — G60.0 CHARCOT-MARIE DISEASE: ICD-10-CM

## 2024-07-29 DIAGNOSIS — M54.42 CHRONIC BILATERAL LOW BACK PAIN WITH BILATERAL SCIATICA: ICD-10-CM

## 2024-07-29 DIAGNOSIS — G89.29 CHRONIC BILATERAL LOW BACK PAIN WITH BILATERAL SCIATICA: ICD-10-CM

## 2024-07-29 PROCEDURE — 99214 OFFICE O/P EST MOD 30 MIN: CPT | Performed by: ORTHOPAEDIC SURGERY

## 2024-07-29 NOTE — PROGRESS NOTES
Session: 30 min   Stress: Not on file   Social Connections: Not on file   Intimate Partner Violence: Not on file   Housing Stability: Not on file       REVIEW OF SYSTEMS:      Negative except for that stated above.     [unfilled]      Prescription medication management discussed with patient.     Electronically signed by: Fabrizio Barboza DO    Note: This note was completed using voice recognition software.  Any typographical/name errors or mistakes are unintentional.

## 2024-07-31 ENCOUNTER — HOSPITAL ENCOUNTER (OUTPATIENT)
Age: 44
Setting detail: RECURRING SERIES
Discharge: HOME | End: 2024-08-03
Payer: COMMERCIAL

## 2024-07-31 PROCEDURE — 97110 THERAPEUTIC EXERCISES: CPT

## 2024-07-31 PROCEDURE — 97112 NEUROMUSCULAR REEDUCATION: CPT

## 2024-07-31 NOTE — PROGRESS NOTES
PT DAILY TREATMENT NOTE     Patient Name: Oscar De La Paz  Date:2024  : 1980  [x]  Patient  Verified  Payor: GEORGE / Plan: [employer] / Product Type: *No Product type* /    In time:1100  Out time:1146  Total Treatment Time (min): 46  Total Timed Codes (min): 46  1:1 Treatment Time (min): 46  Visit #: 2 of 5 (new auth)    Treatment Area: Unilateral primary osteoarthritis, left knee [M17.12]  Unilateral primary osteoarthritis, right knee [M17.11]    SUBJECTIVE  Pt reported states that she is doing ok today.     Pain Level (0-10 scale): 0    Any medication changes, allergies to medications, adverse drug reactions, diagnosis change, or new procedure performed?: [x] No    [] Yes (see summary sheet for update)    OBJECTIVE    12 min Therapeutic Exercise:  [x] See flow sheet :   Rationale: increase ROM, increase strength, improve coordination, and improve balance to improve the patient’s ability to return to prior level of function before injury/illness with reduced pain, achieving optimal strength and function to perform household tasks, daily activities, and return to community events, and improve safety with movements.     34 min Neuromuscular Re-education:  [x]  See flow sheet :   Rationale: increase ROM, increase strength, improve coordination, and improve balance  to improve the patient’s ability to improve balance and stability, attempting to gain control of movement and strength.       min Gait Training:  ___ feet with ___ device on level surfaces with ___ level of assist   Rationale:           With IFEOMA  TA   NR  GT   [facility] [place]: [x] Review HEP    [x] Progressed/Changed HEP based on:   [] positioning   [] body mechanics   [] transfers   [] heat/ice application          Pain Level (0-10 scale) post treatment: 0    ASSESSMENT/Changes in Function: Began session today with warm up on stepper, then proceeded to sit to stands with airex pad under feet, SBA. Pt has LOB but self

## 2024-08-02 ENCOUNTER — HOSPITAL ENCOUNTER (OUTPATIENT)
Age: 44
Setting detail: RECURRING SERIES
Discharge: HOME | End: 2024-08-05
Payer: MEDICARE

## 2024-08-02 PROCEDURE — 97110 THERAPEUTIC EXERCISES: CPT

## 2024-08-02 NOTE — PROGRESS NOTES
PT DAILY TREATMENT NOTE     Patient Name: Oscar De La Paz  Date:2024  : 1980  [x]  Patient  Verified  Payor: GEORGE / Plan: [employer] / Product Type: *No Product type* /    In time:9:54  Out time:10:26  Total Treatment Time (min): 32  Total Timed Codes (min): 32  1:1 Treatment Time (min): 32   Visit # 12      Treatment Area: Unilateral primary osteoarthritis, left knee [M17.12]  Unilateral primary osteoarthritis, right knee [M17.11]    SUBJECTIVE  Pt reports no new complaints, and that she is ready to d/c from therapy.     Pain Level (0-10 scale): 0/10  Any medication changes, allergies to medications, adverse drug reactions, diagnosis change, or new procedure performed?: [x] No    [] Yes (see summary sheet for update)        OBJECTIVE    32 min Therapeutic Exercise:  [] See flow sheet :   Rationale: increase ROM, increase strength, improve coordination, and improve balance to improve the patient’s ability to perform ADLs and increase safety in home and community.        With TE  TA   NR  GT   [facility] [place] [facility] Patient Education: [x] Review HEP    [] Progressed/Changed HEP based on:   [] positioning   [] body mechanics   [] transfers   [] heat/ice application          Pain Level (0-10 scale) post treatment: 0/10    ASSESSMENT/Changes in Function: see d/c note    []  See Plan of Care  []  See progress note/recertification  [x]  See Discharge Summary             PLAN  []  Upgrade activities as tolerated     []  Continue plan of care  []  Update interventions per flow sheet       [x]  Discharge due to:_ progression towards goals   []  Other:_      Pedrito Khan, PT, DPT, CSCS 2024  9:58 AM

## 2024-08-02 NOTE — THERAPY DISCHARGE
[facility]  PHYSICAL THERAPY  [address], [address] * Phone: [phone] * Fax: [phone]  DISCHARGE SUMMARY FOR PHYSICAL THERAPY            Patient Name: Oscar De La Paz : 1980   Treatment/Medical Diagnosis: Unilateral primary osteoarthritis, left knee [M17.12]  Unilateral primary osteoarthritis, right knee [M17.11]   Onset Date:     Referral Source: Ned Hernandez MD Start of Care (SOC): 24   Prior Hospitalization: See Medical History Provider #: [id number]   Prior Level of Function: Independent    Comorbidities: Past Medical History:   Diagnosis Date    CMT (Charcot-Jodee-Tooth disease)     Neuropathy     Osteoarthritis      Foot reconstruction in , )    Medications: Verified on Patient Summary List   Visits from SOC: 12 Missed Visits: 3       Subjective:  Pt reports no new complaints, and that she is ready to d/c from therapy.     Objective:  Palpation: non-tender, decreased to light touch distal LEs        ROM / Strength  [] Unable to assess                 AROM     PROM          Strength       Left Right Left Right Left Right   Hip Flexion         3+/5  (3/5) 3+/5  (3/5)     Extension         4+/5  (3+/5)  (3/5) 4+/5  (3+/5)     Abduction         4/5  (3/5)  (2+/5) 4/5  (3/5)     Adduction               Knee Flexion 130  (124) 130  (120) 138  (134) 135  (132) 4/5  (3+/5)  (3/5) 4+/5 (3+/5)  (3/5)     Extension WFL WFL     4/5  (3+/5) 4-/5  (3+/5)   Ankle Plantarflexion         3+/5  (3/5) 4/5  (3+/5)  (3/5)     Dorsiflexion  Inversion  Eversion         3/5  (<3/5) 3/5  (<3/5)   -limited with ankle AROM bilaterally secondary to CMT and surgical history      Patellar Mobility:     Hypermobile:       [] Left   [] Right         Hypomobile:   [] Left   [] Right                   .     Gait:                [] Normal     [x] Antalgic    [] NWB    Device: none                          Distance: 250 feet  Comments: discontinuous

## 2024-08-17 SDOH — HEALTH STABILITY: PHYSICAL HEALTH: ON AVERAGE, HOW MANY DAYS PER WEEK DO YOU ENGAGE IN MODERATE TO STRENUOUS EXERCISE (LIKE A BRISK WALK)?: 7 DAYS

## 2024-08-17 SDOH — HEALTH STABILITY: PHYSICAL HEALTH: ON AVERAGE, HOW MANY MINUTES DO YOU ENGAGE IN EXERCISE AT THIS LEVEL?: 30 MIN

## 2024-08-20 ENCOUNTER — OFFICE VISIT (OUTPATIENT)
Age: 44
End: 2024-08-20
Payer: MEDICARE

## 2024-08-20 VITALS
BODY MASS INDEX: 24.39 KG/M2 | WEIGHT: 121 LBS | HEIGHT: 59 IN | TEMPERATURE: 98 F | OXYGEN SATURATION: 98 % | HEART RATE: 89 BPM

## 2024-08-20 DIAGNOSIS — M54.16 LUMBAR NEURITIS: ICD-10-CM

## 2024-08-20 DIAGNOSIS — R29.898 MUSCULOSKELETAL INSTABILITY OF BOTH LOWER EXTREMITIES: Primary | ICD-10-CM

## 2024-08-20 PROCEDURE — 72110 X-RAY EXAM L-2 SPINE 4/>VWS: CPT | Performed by: PHYSICAL MEDICINE & REHABILITATION

## 2024-08-20 PROCEDURE — 99204 OFFICE O/P NEW MOD 45 MIN: CPT | Performed by: PHYSICAL MEDICINE & REHABILITATION

## 2024-08-20 NOTE — PROGRESS NOTES
[facility]  [address]  [place] [address]  Tel: [phone]  Fax: [phone]          INITIAL CONSULTATION      HISTORY OF PRESENT ILLNESS:  Oscar De La Paz is a 44 y.o. female who is referred from Fabrizio Barboza, DO secondary to unsteady gait when walking. She rates her pain  0 /10. 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 She reports that she is having BLE weakness and has been experiencing falls.    Patient says she experienced no back pain.She denies change in bowel or bladder habits. Her pain is exacerbated by prolonged periods of standing. She denies recent fevers, weight loss, rashes, or skin sores. She denies a hx of stomach ulcers or bleeding disorders. She denies a hx of history of spinal surgery or injections. She denies recent physical therapy or  chiropractic care. She denies a hx of DM. She reports that to her knowledge her kidneys function properly. She has taken Elavil, Neurontin 400 mg BID. She reports her last EMG was 4-5 years ago done by Dr.Goldberg and she was diagnosed with neuropathy.     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  She was previously seen by Dr. Hernandez on June 13, 2024.  She was diagnosed with bilateral knee chondromalacia and referred to physical therapy.  She states that physical therapy did help but she continues to have a lot of instability. She comes in today for a second opinion and is wondering if custom knee braces would help her as she is not in constant pain and has no clicking/ locking of her knees. She denies any hip pain or radicular symptoms. Sees Dr. Goldberg with Neurology. Describe shooting pain down into her toes.        The

## 2024-08-22 DIAGNOSIS — R29.898 MUSCULOSKELETAL INSTABILITY OF BOTH LOWER EXTREMITIES: ICD-10-CM

## 2024-08-22 DIAGNOSIS — M54.16 LUMBAR NEURITIS: ICD-10-CM

## 2024-10-11 ENCOUNTER — OFFICE VISIT (OUTPATIENT)
Age: 44
End: 2024-10-11
Payer: MEDICARE

## 2024-10-11 VITALS — BODY MASS INDEX: 23.39 KG/M2 | HEIGHT: 59 IN | WEIGHT: 116 LBS

## 2024-10-11 DIAGNOSIS — G60.0 CHARCOT-MARIE DISEASE: ICD-10-CM

## 2024-10-11 DIAGNOSIS — R26.81 UNSTEADY GAIT WHEN WALKING: ICD-10-CM

## 2024-10-11 DIAGNOSIS — M54.41 CHRONIC BILATERAL LOW BACK PAIN WITH BILATERAL SCIATICA: ICD-10-CM

## 2024-10-11 DIAGNOSIS — G89.29 CHRONIC BILATERAL LOW BACK PAIN WITH BILATERAL SCIATICA: ICD-10-CM

## 2024-10-11 DIAGNOSIS — M21.372 FOOT DROP, BILATERAL: ICD-10-CM

## 2024-10-11 DIAGNOSIS — M21.371 FOOT DROP, BILATERAL: ICD-10-CM

## 2024-10-11 DIAGNOSIS — M54.42 CHRONIC BILATERAL LOW BACK PAIN WITH BILATERAL SCIATICA: ICD-10-CM

## 2024-10-11 DIAGNOSIS — R29.898 MUSCULOSKELETAL INSTABILITY OF BOTH LOWER EXTREMITIES: Primary | ICD-10-CM

## 2024-10-11 PROCEDURE — 99213 OFFICE O/P EST LOW 20 MIN: CPT | Performed by: ORTHOPAEDIC SURGERY

## 2024-10-11 NOTE — PROGRESS NOTES
level: Not on file   Occupational History    Not on file   Tobacco Use    Smoking status: Never    Smokeless tobacco: Never   Vaping Use    Vaping status: Never Used   Substance and Sexual Activity    Alcohol use: No    Drug use: Never    Sexual activity: Yes     Partners: Male   Other Topics Concern    Not on file   Social History Narrative    Not on file     Social Determinants of Health     Financial Resource Strain: Not on file   Food Insecurity: Not on file   Transportation Needs: Not on file   Physical Activity: Sufficiently Active (8/17/2024)    Exercise Vital Sign     Days of Exercise per Week: 7 days     Minutes of Exercise per Session: 30 min   Stress: Not on file   Social Connections: Not on file   Intimate Partner Violence: Not on file   Housing Stability: Not on file       REVIEW OF SYSTEMS:      Negative except for that stated above.     [unfilled]      Prescription medication management discussed with patient.     Electronically signed by: Fabrizio Barboza DO    Note: This note was completed using voice recognition software.  Any typographical/name errors or mistakes are unintentional.

## 2024-10-14 ENCOUNTER — TELEPHONE (OUTPATIENT)
Age: 44
End: 2024-10-14

## 2024-10-14 ENCOUNTER — CLINICAL DOCUMENTATION (OUTPATIENT)
Age: 44
End: 2024-10-14

## 2024-10-14 NOTE — TELEPHONE ENCOUNTER
I left a message with Neurology  asking for a call back to see if the EMG was done. I then called the pt to verify and the pt let me know that she did have it done and has a paper copy of those results. Pt is aware I reached out to the neurology to try and get it. Pt will be bringing in those results with her to her follow up 10/15/2024.

## 2024-10-15 ENCOUNTER — OFFICE VISIT (OUTPATIENT)
Age: 44
End: 2024-10-15
Payer: MEDICARE

## 2024-10-15 VITALS
BODY MASS INDEX: 23.59 KG/M2 | HEIGHT: 59 IN | OXYGEN SATURATION: 100 % | TEMPERATURE: 98 F | HEART RATE: 85 BPM | WEIGHT: 117 LBS

## 2024-10-15 DIAGNOSIS — G60.0 CHARCOT-MARIE DISEASE: ICD-10-CM

## 2024-10-15 DIAGNOSIS — R29.898 MUSCULOSKELETAL INSTABILITY OF BOTH LOWER EXTREMITIES: Primary | ICD-10-CM

## 2024-10-15 PROCEDURE — 99213 OFFICE O/P EST LOW 20 MIN: CPT | Performed by: PHYSICAL MEDICINE & REHABILITATION

## 2024-10-15 NOTE — PROGRESS NOTES
[facility]  [address]  [place] [id number]  Tel: [phone]  Fax: [phone]          PROGRESS NOTE      HISTORY OF PRESENT ILLNESS:  The patient is a 44 y.o. female and was seen today for follow up of no pain. 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 She reports that she is having BLE weakness and has been experiencing falls. Patient says she experienced no back pain.She denies change in bowel or bladder habits. Her pain is exacerbated by prolonged periods of standing. She denies recent fevers, weight loss, rashes, or skin sores. She denies a hx of stomach ulcers or bleeding disorders. She denies a hx of history of spinal surgery or injections. She denies recent physical therapy or  chiropractic care. She denies a hx of DM. She reports that to her knowledge her kidneys function properly. She has taken Elavil, Neurontin 400 mg BID. She reports her last EMG was 4-5 years ago done by Dr.Goldberg and she was diagnosed with neuropathy. 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 Note from  dated 7/29/2024 indicating patient was seen for bilateral knee pain and instability.  She was previously seen by Dr. Hernandez on June 13, 2024.  She was diagnosed with bilateral knee chondromalacia and referred to physical therapy.  She states that physical therapy did help but she continues to have a lot of instability. She comes in today for a second opinion and is wondering if custom knee braces would help her as she is not in constant pain and has no clicking/ locking of her knees. She denies any hip pain or radicular symptoms. Sees Dr. Goldberg with Neurology. Describe shooting pain down into her toes. The patient is Right Handed. At her last clinical appointment,  I believe her symptoms align with worsening

## 2025-05-22 ENCOUNTER — TRANSCRIBE ORDERS (OUTPATIENT)
Age: 45
End: 2025-05-22

## 2025-05-22 ENCOUNTER — HOSPITAL ENCOUNTER (OUTPATIENT)
Age: 45
Discharge: HOME | End: 2025-05-25
Payer: MEDICARE

## 2025-05-22 DIAGNOSIS — M25.559 HIP PAIN, UNSPECIFIED LATERALITY: ICD-10-CM

## 2025-05-22 DIAGNOSIS — M25.559 HIP PAIN, UNSPECIFIED LATERALITY: Primary | ICD-10-CM

## 2025-05-22 PROCEDURE — 73502 X-RAY EXAM HIP UNI 2-3 VIEWS: CPT
